# Patient Record
Sex: FEMALE | Race: OTHER
[De-identification: names, ages, dates, MRNs, and addresses within clinical notes are randomized per-mention and may not be internally consistent; named-entity substitution may affect disease eponyms.]

---

## 2018-09-20 ENCOUNTER — HOSPITAL ENCOUNTER (OUTPATIENT)
Dept: HOSPITAL 61 - KCIC MAMMO | Age: 58
Discharge: HOME | End: 2018-09-20
Attending: INTERNAL MEDICINE
Payer: COMMERCIAL

## 2018-09-20 DIAGNOSIS — N63.24: ICD-10-CM

## 2018-09-20 DIAGNOSIS — N63.21: ICD-10-CM

## 2018-09-20 DIAGNOSIS — Z12.31: Primary | ICD-10-CM

## 2018-09-20 PROCEDURE — 77067 SCR MAMMO BI INCL CAD: CPT

## 2018-09-20 NOTE — [facility]
Bilateral digital screening mammograms:

 

Reason for examination: Routine screening.

 

Comparison is made to previous studies dated 9/8/2016 and 10/13/2011.

 

Interpretation was made with the benefit of CAD.

 

The skin and nipples show no abnormalities. No abnormal axillary lymph 

nodes are seen. The breast parenchyma is extremely dense. (Breast density:

Category D.) There are new nodules at the 1:00 B position of the left 

breast and inferiorly in the left breast on oblique view probably at the 

7:00 B position. These may represent cysts. Recommend further evaluation 

with ultrasound. There are no other dominant masses, suspicious 

calcifications or architectural distortion.

 

Impression:

 

Nodules in the left breast at approximately the 1:00 B and 7:00 B 

positions. Recommend further evaluation with ultrasound.

 

Your patient's mammogram demonstrates that she has dense breast tissue 

(breast density category C or D), which could hide abnormalities, and if 

she has other risk factors for breast cancer that have been identified, 

she might benefit from supplemental screening tests that may be suggested 

by you as her ordering physician. Dense breast tissue, in and of itself, 

is a relatively common condition. Therefore, this information is not 

provided to cause undue concern, but rather to raise your awareness and to

promote discussion with your patient regarding the presence of other risk 

factors, in addition to dense breast tissue. Your patient's mammography 

results will be sent to her. 

 

BI-RAD Category 0: Incomplete. Needs additional imaging evaluation.

 

"Our facility is accredited by the [employer] 

Mammography Program."

 

This patient's information has been entered into a reminder system for the

patient to be notified with the results of her examination and a target 

date for the next mammogram.

 

Electronically signed by: Jyotsna Tena MD (9/20/2018 5:58 PM) [place] [id number]

## 2019-12-30 ENCOUNTER — HOSPITAL ENCOUNTER (OUTPATIENT)
Dept: HOSPITAL 61 - ENDOS | Age: 59
End: 2019-12-30
Payer: COMMERCIAL

## 2019-12-30 VITALS
DIASTOLIC BLOOD PRESSURE: 58 MMHG | SYSTOLIC BLOOD PRESSURE: 108 MMHG | SYSTOLIC BLOOD PRESSURE: 108 MMHG | DIASTOLIC BLOOD PRESSURE: 58 MMHG

## 2019-12-30 DIAGNOSIS — Z98.890: ICD-10-CM

## 2019-12-30 DIAGNOSIS — Z12.11: Primary | ICD-10-CM

## 2019-12-30 DIAGNOSIS — Z90.710: ICD-10-CM

## 2019-12-30 DIAGNOSIS — Z88.8: ICD-10-CM

## 2019-12-30 DIAGNOSIS — Z98.51: ICD-10-CM

## 2019-12-30 DIAGNOSIS — Z86.010: ICD-10-CM

## 2019-12-30 DIAGNOSIS — I10: ICD-10-CM

## 2019-12-30 DIAGNOSIS — Z88.6: ICD-10-CM

## 2019-12-30 DIAGNOSIS — I25.2: ICD-10-CM

## 2019-12-30 PROCEDURE — 45378 DIAGNOSTIC COLONOSCOPY: CPT

## 2019-12-30 NOTE — PDOC4
PROCEDURE


Procedure


colonoscopy





Indication: h/o polyps





Meds: per anesthesia





Findings:





YVONNE normal.


'Scope advanced to cecum.  Mucosa normal.  Prep good.  No polyps, etc. seen.  

Normal retroflex.


[place]. well.





IMP: Negative surveillance.





REC: Repeat exam 5 years.


         Resume home meds, diet.


         F/u with me prn.











SHANE ERNST MD          Dec 30, 2019 13:23

## 2020-01-31 ENCOUNTER — HOSPITAL ENCOUNTER (OUTPATIENT)
Dept: HOSPITAL 61 - KCIC MAMMO | Age: 60
Discharge: HOME | End: 2020-01-31
Attending: INTERNAL MEDICINE
Payer: COMMERCIAL

## 2020-01-31 DIAGNOSIS — Z12.31: Primary | ICD-10-CM

## 2020-01-31 PROCEDURE — 77067 SCR MAMMO BI INCL CAD: CPT

## 2020-01-31 NOTE — [facility]
Bilateral digital screening mammograms:

 

Reason for examination: Routine screening.

 

Comparison is made to previous studies dated 9/20/2018 and 9/8/2016.

 

Interpretation was made with the benefit of CAD.

 

The skin and nipples show no abnormalities. No abnormal axillary lymph 

nodes are seen. The breast parenchyma is heterogeneously dense. (Breast 

density: Category C.) There are no dominant masses, suspicious 

calcifications or architectural distortion.

 

Impression:

 

No evidence of malignancy. Recommend routine screening.

 

Your patient's mammogram demonstrates that she has dense breast tissue 

(breast density category C or D), which could hide abnormalities, and if 

she has other risk factors for breast cancer that have been identified, 

she might benefit from supplemental screening tests that may be suggested 

by you as her ordering physician. Dense breast tissue, in and of itself, 

is a relatively common condition. Therefore, this information is not 

provided to cause undue concern, but rather to raise your awareness and to

promote discussion with your patient regarding the presence of other risk 

factors, in addition to dense breast tissue. Your patient's mammography 

results will be sent to her.

 

BI-RAD Category 1: Negative.

 

"Our facility is accredited by the [employer] 

Mammography Program."

 

This patient's information has been entered into a reminder system for the

patient to be notified with the results of her examination and a target 

date for the next mammogram.

 

Electronically signed by: Jyotsna Tena MD (1/31/2020 2:46 PM) [id number]